# Patient Record
Sex: MALE | Race: WHITE | URBAN - METROPOLITAN AREA
[De-identification: names, ages, dates, MRNs, and addresses within clinical notes are randomized per-mention and may not be internally consistent; named-entity substitution may affect disease eponyms.]

---

## 2024-03-04 ENCOUNTER — OFFICE VISIT (OUTPATIENT)
Dept: URGENT CARE | Facility: CLINIC | Age: 34
End: 2024-03-04

## 2024-03-04 VITALS
TEMPERATURE: 98 F | WEIGHT: 185 LBS | HEART RATE: 78 BPM | DIASTOLIC BLOOD PRESSURE: 63 MMHG | OXYGEN SATURATION: 96 % | RESPIRATION RATE: 18 BRPM | SYSTOLIC BLOOD PRESSURE: 114 MMHG

## 2024-03-04 DIAGNOSIS — J01.90 ACUTE NON-RECURRENT SINUSITIS, UNSPECIFIED LOCATION: Primary | ICD-10-CM

## 2024-03-04 PROCEDURE — 1036F TOBACCO NON-USER: CPT

## 2024-03-04 PROCEDURE — 99203 OFFICE O/P NEW LOW 30 MIN: CPT

## 2024-03-04 RX ORDER — FLUTICASONE PROPIONATE 50 MCG
1 SPRAY, SUSPENSION (ML) NASAL DAILY
Qty: 16 G | Refills: 0 | Status: SHIPPED | OUTPATIENT
Start: 2024-03-04 | End: 2025-03-04

## 2024-03-04 RX ORDER — AMOXICILLIN AND CLAVULANATE POTASSIUM 875; 125 MG/1; MG/1
1 TABLET, FILM COATED ORAL 2 TIMES DAILY
Qty: 14 TABLET | Refills: 0 | Status: SHIPPED | OUTPATIENT
Start: 2024-03-04 | End: 2024-03-11

## 2024-03-04 ASSESSMENT — ENCOUNTER SYMPTOMS
ABDOMINAL PAIN: 0
DIARRHEA: 0
SHORTNESS OF BREATH: 0
VOMITING: 0
RHINORRHEA: 1
SINUS COMPLAINT: 1
HEADACHES: 0
COUGH: 0
NAUSEA: 0
MYALGIAS: 0
TROUBLE SWALLOWING: 0
SINUS PRESSURE: 1
FATIGUE: 1
CHILLS: 0
SORE THROAT: 0

## 2024-03-04 ASSESSMENT — PAIN SCALES - GENERAL: PAINLEVEL: 4

## 2024-03-04 NOTE — PATIENT INSTRUCTIONS
Discussed with patient that on exam both eardrums appear to have been ruptured and that is why he was having discharge from bilateral ears.  Starting patient on Augmentin twice daily for the next 7 days for sinus infection as well as Flonase to help with the nasal congestion.  Discussed with patient that if the hearing does not improve he will need to follow-up with ENT.  If there is any development of fevers shortness of breath or difficulty breathing while on the antibiotic please proceed to the ER for further evaluation.

## 2024-03-04 NOTE — PROGRESS NOTES
Subjective   Patient ID: Miki Stahl is a 33 y.o. male.      Sinus Problem  Associated symptoms: congestion, ear pain, fatigue and rhinorrhea    Associated symptoms: no abdominal pain, no chest pain, no cough, no diarrhea, no headaches, no myalgias, no nausea, no shortness of breath, no sore throat and no vomiting    Ear Drainage  Associated symptoms: congestion, ear pain, fatigue and rhinorrhea    Associated symptoms: no abdominal pain, no chest pain, no cough, no diarrhea, no headaches, no myalgias, no nausea, no shortness of breath, no sore throat and no vomiting    Fatigue  Associated symptoms: congestion, ear pain, fatigue and rhinorrhea    Associated symptoms: no abdominal pain, no chest pain, no cough, no diarrhea, no headaches, no myalgias, no nausea, no shortness of breath, no sore throat and no vomiting        Patient presents for complaints of runny nose bilateral ear pain ringing in ears with discharge sinus pressure and headache for the last 6 days.  Patient states that he did have myalgias but those have since resolved.  Patient states that his son was recently sick and was sleeping in his face and believes that he may have contracted something from him.  Patient denies any known fevers nausea vomiting or diarrhea.  Patient denies any chest pain shortness of breath or difficulty breathing.    Review of Systems   Constitutional:  Positive for fatigue. Negative for chills.   HENT:  Positive for congestion, ear discharge, ear pain, rhinorrhea and sinus pressure. Negative for sore throat and trouble swallowing.    Respiratory:  Negative for cough and shortness of breath.    Cardiovascular:  Negative for chest pain.   Gastrointestinal:  Negative for abdominal pain, diarrhea, nausea and vomiting.   Musculoskeletal:  Negative for myalgias.   Neurological:  Negative for headaches.   All other systems reviewed and are negative.    Objective   Physical Exam  Constitutional:       Appearance: Normal appearance.    HENT:      Head: Normocephalic and atraumatic.      Right Ear: Decreased hearing noted. No swelling or tenderness. Tympanic membrane is perforated.      Left Ear: Decreased hearing noted. No swelling or tenderness. Tympanic membrane is perforated.      Nose: Congestion present.      Mouth/Throat:      Mouth: Mucous membranes are moist.      Pharynx: Oropharynx is clear.   Eyes:      Extraocular Movements: Extraocular movements intact.      Conjunctiva/sclera: Conjunctivae normal.      Pupils: Pupils are equal, round, and reactive to light.   Cardiovascular:      Rate and Rhythm: Normal rate and regular rhythm.      Pulses: Normal pulses.      Heart sounds: Normal heart sounds.   Pulmonary:      Effort: Pulmonary effort is normal.      Breath sounds: Normal breath sounds.   Abdominal:      General: Abdomen is flat.      Palpations: Abdomen is soft.   Musculoskeletal:         General: Normal range of motion.   Skin:     General: Skin is warm and dry.      Capillary Refill: Capillary refill takes less than 2 seconds.   Neurological:      General: No focal deficit present.      Mental Status: He is alert and oriented to person, place, and time.       Discussed with patient that on exam both eardrums appear to have been ruptured and that is why he was having discharge from bilateral ears.  Starting patient on Augmentin twice daily for the next 7 days for sinus infection as well as Flonase to help with the nasal congestion.  Discussed with patient that if the hearing does not improve he will need to follow-up with ENT.  If there is any development of fevers shortness of breath or difficulty breathing while on the antibiotic please proceed to the ER for further evaluation.    Assessment/Plan   Problem List Items Addressed This Visit    None  Visit Diagnoses         Codes    Acute non-recurrent sinusitis, unspecified location    -  Primary J01.90    Relevant Medications    amoxicillin-pot clavulanate (Augmentin) 875125  mg tablet    fluticasone (Flonase) 50 mcg/actuation nasal spray